# Patient Record
Sex: FEMALE | Race: WHITE | NOT HISPANIC OR LATINO | Employment: OTHER | URBAN - METROPOLITAN AREA
[De-identification: names, ages, dates, MRNs, and addresses within clinical notes are randomized per-mention and may not be internally consistent; named-entity substitution may affect disease eponyms.]

---

## 2019-12-13 ENCOUNTER — TRANSCRIBE ORDERS (OUTPATIENT)
Dept: ADMINISTRATIVE | Facility: HOSPITAL | Age: 59
End: 2019-12-13

## 2019-12-13 DIAGNOSIS — M25.512 LEFT SHOULDER PAIN, UNSPECIFIED CHRONICITY: Primary | ICD-10-CM

## 2019-12-20 ENCOUNTER — PROCEDURE VISIT (OUTPATIENT)
Dept: NEUROLOGY | Facility: CLINIC | Age: 59
End: 2019-12-20

## 2019-12-20 ENCOUNTER — HOSPITAL ENCOUNTER (OUTPATIENT)
Dept: MRI IMAGING | Facility: HOSPITAL | Age: 59
Discharge: HOME | End: 2019-12-20
Admitting: ORTHOPAEDIC SURGERY

## 2019-12-20 DIAGNOSIS — G56.02 CARPAL TUNNEL SYNDROME OF LEFT WRIST: Primary | ICD-10-CM

## 2019-12-20 DIAGNOSIS — M25.512 LEFT SHOULDER PAIN, UNSPECIFIED CHRONICITY: ICD-10-CM

## 2019-12-20 PROCEDURE — 95908 NRV CNDJ TST 3-4 STUDIES: CPT | Performed by: PSYCHIATRY & NEUROLOGY

## 2019-12-20 PROCEDURE — 95886 MUSC TEST DONE W/N TEST COMP: CPT | Performed by: PSYCHIATRY & NEUROLOGY

## 2019-12-20 PROCEDURE — 73221 MRI JOINT UPR EXTREM W/O DYE: CPT

## 2019-12-20 NOTE — PROGRESS NOTES
EMG and Nerve Conduction Studies    The complete report includes the data sheets.     Referring Doctor:DR. GALE    History:LUE/LEFT ARM PAIN     Results:    1.  The median sensory distal latency is prolonged.  The median motor NCS is normal.     2.  The Ulnar sensory and motor NCS's were normal    3. EMG of the muscles tested in the left C5-T1 myotomes were normal.    Impression:    This is an abnormal study.  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     Joseph Seipel, M.D.

## 2019-12-30 ENCOUNTER — TELEPHONE (OUTPATIENT)
Dept: NEUROLOGY | Facility: CLINIC | Age: 59
End: 2019-12-30

## 2019-12-31 PROBLEM — M54.9 INTRACTABLE BACK PAIN: Status: ACTIVE | Noted: 2019-12-05

## 2020-02-17 ENCOUNTER — TRANSCRIBE ORDERS (OUTPATIENT)
Dept: PHYSICAL THERAPY | Facility: CLINIC | Age: 60
End: 2020-02-17

## 2020-02-17 ENCOUNTER — TREATMENT (OUTPATIENT)
Dept: PHYSICAL THERAPY | Facility: CLINIC | Age: 60
End: 2020-02-17

## 2020-02-17 DIAGNOSIS — M54.12 RADICULOPATHY, CERVICAL: Primary | ICD-10-CM

## 2020-02-17 DIAGNOSIS — M25.512 ACUTE PAIN OF LEFT SHOULDER: ICD-10-CM

## 2020-02-17 DIAGNOSIS — M54.2 PAIN, NECK: Primary | ICD-10-CM

## 2020-02-17 PROCEDURE — 97162 PT EVAL MOD COMPLEX 30 MIN: CPT | Performed by: PHYSICAL THERAPIST

## 2020-02-17 PROCEDURE — 97140 MANUAL THERAPY 1/> REGIONS: CPT | Performed by: PHYSICAL THERAPIST

## 2020-02-17 PROCEDURE — 97014 ELECTRIC STIMULATION THERAPY: CPT | Performed by: PHYSICAL THERAPIST

## 2020-02-17 NOTE — PROGRESS NOTES
"  Physical Therapy Initial Evaluation and Plan of Care    Patient: Vivi Spence   : 1960  Diagnosis/ICD-10 Code:  Radiculopathy, cervical [M54.12]  Referring practitioner: Conor Quinones MD  Date of Initial Visit: 2020  Today's Date: 2020  Patient seen for 1 sessions           Subjective Questionnaire: QuickDASH: 77% impairment  NDI: 30% impairment      Subjective Evaluation    History of Present Illness  Mechanism of injury: Pt is 60 female with few yr hx of neck pain that radiates out to L shoulder. Pt also with tingling in L thumb and index finger. Pt did not sleep well this morning. Works 3rd shift. Pt hopes to avoid neck surgery. States she uses a \"bone\" pillow and sleeps with head back. Pt does not want to have surgery and states she has had epidural in 2019 in her upper back with no relief. Feels like L hand has gotten weak. Difficulty looking down with increased pain. Pt had MRI of L shoulder in 2019 that showed a couple of small tears. Pt states L shoulder pain is better and is not hurting now. Reports going to chiropractor 2x/wk but taking a break while receiving PT.      Patient Occupation: Full-time at [employer]. Quality of life: good    Pain  Current pain rating: 3  At best pain ratin  At worst pain ratin  Location: neck and L UT  Quality: needle-like, radiating and dull ache  Relieving factors: heat and rest  Aggravating factors: lifting, overhead activity, sleeping and prolonged positioning  Progression: worsening    Social Support  Lives with: spouse    Hand dominance: left    Treatments  Current treatment: chiropractic  Patient Goals  Patient goals for therapy: decreased pain, increased motion, increased strength and independence with ADLs/IADLs             Objective       Postural Observations  Seated posture: fair    Additional Postural Observation Details  Forward head, rounded shoulder, L scap abducted.    Palpation   Left   Tenderness of the cervical " paraspinals, suboccipitals and upper trapezius.     Active Range of Motion   Left Shoulder   Flexion: 95 degrees with pain  External rotation BTH: C2   Internal rotation BTB: L1     Right Shoulder   External rotation BTH: C6   Internal rotation BTB: L1     Additional Active Range of Motion Details  Mild limitation in cervical flexion, and R rotation. Mod limitation in L rotation and R sidebending.    Passive Range of Motion   Left Shoulder   Normal passive range of motion    Strength/Myotome Testing     Left Shoulder     Planes of Motion   Flexion: 3-   Abduction: 4   External rotation at 0°: 4-     Right Shoulder     Planes of Motion   Flexion: 4+   Abduction: 4+   External rotation at 0°: 4     Left Elbow   Flexion: 4-  Extension: 4-    Right Elbow   Flexion: 4+  Extension: 4+    Left Wrist/Hand   Wrist extension: 3+    Right Wrist/Hand   Wrist extension: 4+    Additional Strength Details  Decreased  L hand with pt unable to make full fist.   C/o L shoulder pain with strength testing.    Tests   Cervical     Left   Positive cervical distraction.          Assessment & Plan     Assessment  Impairments: abnormal or restricted ROM, activity intolerance, impaired physical strength, lacks appropriate home exercise program and pain with function  Other impairment: impaired posture  Assessment details: Pt is 59 yo female with c/o worsening neck pain with L UE radicular symptoms in thumb and index finger. Pt also with decreased strength and AROM of R shoulder with pain. Pt is having difficulty with looking down >5 min. Difficulty with overhead tasks. Difficulty turning head to L. Difficulty with numbness L thumb and index finger (dominant hand). Quick DASH indicates 77% impairment. NDI indicates 30% impairment.    Patient presents with the impairments listed above and based on the objective findings and the physical therapy evaluation, the patient’s condition has the potential to improve in response to therapy.   The  patient’s condition and/or services required are at a level of complexity that necessitates the skill & supervision of a physical therapist.    Prognosis: good  Functional Limitations: carrying objects, lifting, sleeping, pulling, pushing, uncomfortable because of pain, reaching behind back, reaching overhead and unable to perform repetitive tasks  Goals  Plan Goals: STG:  - Pt to report 25% improvement in L thumb and index finger numbness in 3 weeks.  - Pt to demonstrate improved posture with cues in 2 weeks.  - Pt to be independent with HEP in 3 weeks.  LTG:  - Improve Quick DASH score to 30% or less impairment by discharge.  - Increase L shoulder flex strength to 4-/5 or better for improved functional use of L UE by discharge.  - Pt to report 50% improvement in L UE radicular symptoms by discharge.    Plan  Therapy options: will be seen for skilled physical therapy services  Planned modality interventions: TENS, thermotherapy (hydrocollator packs) and traction  Other planned modality interventions: modalities as indicated  Frequency: 2x week  Duration in visits: 12  Treatment plan discussed with: patient        Timed:         Manual Therapy:    15     mins  73720;     Therapeutic Exercise:    5     mins  89715;     Neuromuscular Waldo:        mins  35705;    Therapeutic Activity:          mins  83294;     Gait Training:           mins  15341;     Ultrasound:          mins  67896;    Ionto                                   mins   99264  Can Repos          mins 09742    Un-Timed:  Electrical Stimulation:    15     mins  85099 ( );  Dry Needling          mins self-pay  Traction          mins 74719  Low Eval          Mins  78828  Mod Eval     25     Mins  13092  High Eval                            Mins  97122  Self - Care                          mins  66440        Timed Treatment:   20   mins   Total Treatment:     60   mins    PT SIGNATURE: Regi Kolb, PT   DATE TREATMENT INITIATED:  2/17/2020    Initial Certification  Certification Period: 5/17/2020  I certify that the therapy services are furnished while this patient is under my care.  The services outlined above are required by this patient, and will be reviewed every 90 days.     PHYSICIAN: Conor Quinones MD ____________________________________________________________________________     DATE:     Please sign and return via fax to  .. Thank you, [facility].

## 2020-02-20 ENCOUNTER — HOSPITAL ENCOUNTER (OUTPATIENT)
Dept: GENERAL RADIOLOGY | Facility: HOSPITAL | Age: 60
Discharge: HOME | End: 2020-02-20

## 2020-02-20 ENCOUNTER — HOSPITAL ENCOUNTER (OUTPATIENT)
Dept: GENERAL RADIOLOGY | Facility: HOSPITAL | Age: 60
Discharge: HOME | End: 2020-02-20
Admitting: ORTHOPAEDIC SURGERY

## 2020-02-20 DIAGNOSIS — M25.551 BILATERAL HIP PAIN: ICD-10-CM

## 2020-02-20 DIAGNOSIS — M25.552 BILATERAL HIP PAIN: ICD-10-CM

## 2020-02-20 DIAGNOSIS — M54.50 LOW BACK PAIN: ICD-10-CM

## 2020-02-20 PROCEDURE — 72170 X-RAY EXAM OF PELVIS: CPT

## 2020-02-20 PROCEDURE — 72100 X-RAY EXAM L-S SPINE 2/3 VWS: CPT

## 2020-02-21 ENCOUNTER — TREATMENT (OUTPATIENT)
Dept: PHYSICAL THERAPY | Facility: CLINIC | Age: 60
End: 2020-02-21

## 2020-02-21 DIAGNOSIS — M54.12 RADICULOPATHY, CERVICAL: Primary | ICD-10-CM

## 2020-02-21 DIAGNOSIS — M25.512 ACUTE PAIN OF LEFT SHOULDER: ICD-10-CM

## 2020-02-21 PROCEDURE — 97110 THERAPEUTIC EXERCISES: CPT | Performed by: PHYSICAL THERAPIST

## 2020-02-21 PROCEDURE — 97014 ELECTRIC STIMULATION THERAPY: CPT | Performed by: PHYSICAL THERAPIST

## 2020-02-21 PROCEDURE — 97140 MANUAL THERAPY 1/> REGIONS: CPT | Performed by: PHYSICAL THERAPIST

## 2020-02-21 NOTE — PROGRESS NOTES
Physical Therapy Daily Progress Note      Patient: Vivi Spence   : 1960  Diagnosis/ICD-10 Code:  Radiculopathy, cervical [M54.12]  Referring practitioner: Conor Quinones MD  Date of Initial Visit: Type: THERAPY  Noted: 2020  Today's Date: 2020  Patient seen for 2 sessions         Vivi Spence reports: she continues to have the same initial pains and symptoms but states the initial visit did help to relieve her pain and symptoms for the remainder of that day she felt improved. Pt. Inquired about a neck massager and what methods were appropriate for home use. Pt. Reports her L hip is bothering her badly this date as well.    Objective   See Exercise, Manual, and Modality Logs for complete treatment.     Assessment/Plan   Pt. Tolerated treatment well this date and was encouraged to approach any outside use of massager with hesitancy at first and increase as tolerated for improving relief. Pt. Was given information on home TENS unit and responded well to therapy this date.     Progress per Plan of Care           Timed:         Manual Therapy:    15     mins  75552;     Therapeutic Exercise:    15     mins  35929;     Neuromuscular Waldo:        mins  94924;    Therapeutic Activity:          mins  95313;     Gait Training:           mins  85621;     Ultrasound:          mins  57077;    Ionto                                   mins   86930  Self Care                            mins   94587  Canalith Repos                   mins  4209    Un-Timed:  Electrical Stimulation:    15     mins  15832 ( );  Dry Needling          mins self-pay  Traction          mins 24976  Low Eval          Mins  44147  Mod Eval          Mins  73933  High Eval                            Mins  44279    Timed Treatment:   30   mins   Total Treatment:     45   mins    Sandi Rajput PTA  Physical Therapist Assistant License #[id number]

## 2020-02-26 ENCOUNTER — TREATMENT (OUTPATIENT)
Dept: PHYSICAL THERAPY | Facility: CLINIC | Age: 60
End: 2020-02-26

## 2020-02-26 DIAGNOSIS — M25.512 ACUTE PAIN OF LEFT SHOULDER: ICD-10-CM

## 2020-02-26 DIAGNOSIS — M54.12 RADICULOPATHY, CERVICAL: Primary | ICD-10-CM

## 2020-02-26 PROCEDURE — 97012 MECHANICAL TRACTION THERAPY: CPT | Performed by: PHYSICAL THERAPIST

## 2020-02-26 PROCEDURE — 97110 THERAPEUTIC EXERCISES: CPT | Performed by: PHYSICAL THERAPIST

## 2020-02-26 PROCEDURE — 97140 MANUAL THERAPY 1/> REGIONS: CPT | Performed by: PHYSICAL THERAPIST

## 2020-02-26 NOTE — PROGRESS NOTES
Physical Therapy Daily Progress Note      Patient: Vivi Spence   : 1960  Diagnosis/ICD-10 Code:  Radiculopathy, cervical [M54.12]  Referring practitioner: Conor Quinones MD  Date of Initial Visit: Type: THERAPY  Noted: 2020  Today's Date: 2020  Patient seen for 3 sessions         Vivi Spence reports: she isn't having pain today stating she has been sleeping with a cervical pillow and I that helped tremendously.      Objective   See Exercise, Manual, and Modality Logs for complete treatment.       Assessment/Plan   Pt. responded well to initiation of traction this date reporting suboccipital soreness following but noted some improvement in hand numbness. Pt. Continues to tolerate manual intervention and exercises well.    Progress per Plan of Care           Timed:         Manual Therapy:    15     mins  59109;     Therapeutic Exercise:    10     mins  84806;     Neuromuscular Waldo:        mins  91593;    Therapeutic Activity:          mins  37817;     Gait Training:           mins  61437;     Ultrasound:          mins  90144;    Ionto                                   mins   00539  Self Care                            mins   01584  Canalith Repos                   mins  4209    Un-Timed:  Electrical Stimulation:         mins  82858 ( );  Dry Needling          mins self-pay  Traction     15     mins 57736  Low Eval          Mins  82060  Mod Eval          Mins  94527  High Eval                            Mins  75652    Timed Treatment:   25   mins   Total Treatment:     40   mins    Sandi Rajput PTA  Physical Therapist Assistant License #[id number]

## 2020-03-09 ENCOUNTER — TREATMENT (OUTPATIENT)
Dept: PHYSICAL THERAPY | Facility: CLINIC | Age: 60
End: 2020-03-09

## 2020-03-09 DIAGNOSIS — M54.12 RADICULOPATHY, CERVICAL: Primary | ICD-10-CM

## 2020-03-09 DIAGNOSIS — M25.512 ACUTE PAIN OF LEFT SHOULDER: ICD-10-CM

## 2020-03-09 PROCEDURE — 97110 THERAPEUTIC EXERCISES: CPT | Performed by: PHYSICAL THERAPIST

## 2020-03-09 PROCEDURE — 97140 MANUAL THERAPY 1/> REGIONS: CPT | Performed by: PHYSICAL THERAPIST

## 2020-03-09 PROCEDURE — 97014 ELECTRIC STIMULATION THERAPY: CPT | Performed by: PHYSICAL THERAPIST

## 2020-03-09 NOTE — PROGRESS NOTES
Physical Therapy Daily Progress Note      Patient: Vivi Spence   : 1960  Diagnosis/ICD-10 Code:  Radiculopathy, cervical [M54.12]  Referring practitioner: Conor Quinones MD  Date of Initial Visit: Type: THERAPY  Noted: 2020  Today's Date: 3/9/2020  Patient seen for 4 sessions         Vivi Spence reports: she felt much better after traction however states she has had the flu and didn't want to bring it here so she did not come for a few visits. Pt. States she has bronchitis now and is having a really bad intermittent cough.    Objective   See Exercise, Manual, and Modality Logs for complete treatment.     Assessment/Plan   Pt. Responds well to manual intervention and exercise this date having much improved cervical mobility and decreased pain. Traction was held this date due to the persistent cough and the inability to lie still for that long.    Progress per Plan of Care           Timed:         Manual Therapy:    10     mins  08730;     Therapeutic Exercise:    15     mins  97433;     Neuromuscular Waldo:        mins  75015;    Therapeutic Activity:          mins  57160;     Gait Training:           mins  59937;     Ultrasound:          mins  31519;    Ionto                                   mins   14609  Self Care                            mins   69876  Canalith Repos                   mins  4209    Un-Timed:  Electrical Stimulation:    15     mins  82349 ( );  Dry Needling          mins self-pay  Traction          mins 22590  Low Eval          Mins  49403  Mod Eval          Mins  90592  High Eval                            Mins  23139    Timed Treatment:   25   mins   Total Treatment:     40   mins    Sandi Rajput PTA  Physical Therapist Assistant License #[id number]

## 2020-03-13 ENCOUNTER — TREATMENT (OUTPATIENT)
Dept: PHYSICAL THERAPY | Facility: CLINIC | Age: 60
End: 2020-03-13

## 2020-03-13 DIAGNOSIS — M25.512 ACUTE PAIN OF LEFT SHOULDER: ICD-10-CM

## 2020-03-13 DIAGNOSIS — M54.12 RADICULOPATHY, CERVICAL: Primary | ICD-10-CM

## 2020-03-13 PROCEDURE — 97012 MECHANICAL TRACTION THERAPY: CPT | Performed by: PHYSICAL THERAPIST

## 2020-03-13 PROCEDURE — 97110 THERAPEUTIC EXERCISES: CPT | Performed by: PHYSICAL THERAPIST

## 2020-03-13 PROCEDURE — 97140 MANUAL THERAPY 1/> REGIONS: CPT | Performed by: PHYSICAL THERAPIST

## 2020-03-13 NOTE — PROGRESS NOTES
Physical Therapy Daily Progress Note      Patient: Vivi Spence   : 1960  Diagnosis/ICD-10 Code:  Radiculopathy, cervical [M54.12]  Referring practitioner: Conor Quinones MD  Date of Initial Visit: Type: THERAPY  Noted: 2020  Today's Date: 3/13/2020  Patient seen for 5 sessions         Vivi Spence reports: she has been feeling better only thing still bothering her is two numb fingers in her L hand and wonders if it'll ever go away.    Objective   See Exercise, Manual, and Modality Logs for complete treatment.     Assessment/Plan   Pt. presents with increasing cervical ROM ad no shoulder pain at this time. Pt. Responds increasingly well to traction performed for second time this visit with increased pull to 12 pounds.     Progress per Plan of Care           Timed:         Manual Therapy:    20     mins  25665;     Therapeutic Exercise:    8     mins  77135;     Neuromuscular Waldo:        mins  45665;    Therapeutic Activity:          mins  79937;     Gait Training:           mins  58499;     Ultrasound:          mins  70187;    Ionto                                   mins   64605  Self Care                            mins   85020  Canalith Repos                   mins  4209    Un-Timed:  Electrical Stimulation:         mins  61476 ( );  Dry Needling          mins self-pay  Traction     15     mins 35284  Low Eval          Mins  04789  Mod Eval          Mins  10870  High Eval                            Mins  84270    Timed Treatment:   28   mins   Total Treatment:     43   mins    Sandi Rajput PTA  Physical Therapist Assistant License #[id number]

## 2020-03-16 ENCOUNTER — TREATMENT (OUTPATIENT)
Dept: PHYSICAL THERAPY | Facility: CLINIC | Age: 60
End: 2020-03-16

## 2020-03-16 DIAGNOSIS — M25.512 ACUTE PAIN OF LEFT SHOULDER: ICD-10-CM

## 2020-03-16 DIAGNOSIS — M54.12 RADICULOPATHY, CERVICAL: Primary | ICD-10-CM

## 2020-03-16 PROCEDURE — 97140 MANUAL THERAPY 1/> REGIONS: CPT | Performed by: PHYSICAL THERAPIST

## 2020-03-16 PROCEDURE — 97012 MECHANICAL TRACTION THERAPY: CPT | Performed by: PHYSICAL THERAPIST

## 2020-03-16 NOTE — PROGRESS NOTES
Physical Therapy Daily Progress Note      Patient: Vivi Spence   : 1960  Diagnosis/ICD-10 Code:  Radiculopathy, cervical [M54.12]  Referring practitioner: Conor Quinones MD  Date of Initial Visit: Type: THERAPY  Noted: 2020  Today's Date: 3/16/2020  Patient seen for 6 sessions         Vivi Spence reports: she continues to have good relief with traction and states she can make a fist, raise her L arm higher, and oppose all fingers now with minimal numbness intermittent.    Objective   See Exercise, Manual, and Modality Logs for complete treatment.     Assessment/Plan   Pt. Continues to show improved L UE mobility and strength with continued traction with measured L hand  strength at 40 pounds and R hand  strength at 60 pounds showing improvement but the need for further strength gains.    Progress per Plan of Care           Timed:         Manual Therapy:    15     mins  95301;     Therapeutic Exercise:    6     mins  59617;     Neuromuscular Waldo:        mins  41790;    Therapeutic Activity:          mins  25553;     Gait Training:           mins  85451;     Ultrasound:          mins  66841;    Ionto                                   mins   77195  Self Care                            mins   28240  Canalith Repos                   mins  4209    Un-Timed:  Electrical Stimulation:         mins  53383 ( );  Dry Needling          mins self-pay  Traction     15     mins 03752  Low Eval          Mins  50794  Mod Eval          Mins  64980  High Eval                            Mins  34931    Timed Treatment:   21   mins   Total Treatment:     36   mins    Sandi Rajput PTA  Physical Therapist Assistant License #[id number]

## 2020-03-20 ENCOUNTER — TREATMENT (OUTPATIENT)
Dept: PHYSICAL THERAPY | Facility: CLINIC | Age: 60
End: 2020-03-20

## 2020-03-20 DIAGNOSIS — M54.12 RADICULOPATHY, CERVICAL: Primary | ICD-10-CM

## 2020-03-20 DIAGNOSIS — M25.512 ACUTE PAIN OF LEFT SHOULDER: ICD-10-CM

## 2020-03-20 PROCEDURE — 97012 MECHANICAL TRACTION THERAPY: CPT | Performed by: PHYSICAL THERAPIST

## 2020-03-20 PROCEDURE — 97140 MANUAL THERAPY 1/> REGIONS: CPT | Performed by: PHYSICAL THERAPIST

## 2020-03-20 NOTE — PROGRESS NOTES
Physical Therapy Daily Progress Note      Patient: Vivi Spence   : 1960  Diagnosis/ICD-10 Code:  Radiculopathy, cervical [M54.12]  Referring practitioner: Conor Quinones MD  Date of Initial Visit: Type: THERAPY  Noted: 2020  Today's Date: 3/20/2020  Patient seen for 7 sessions      Subjective : Pt reports she is feeling better overall with less pain and L UE radicular symptoms. States she does have numbness and tingling at end of work shift.    Objective : Pt in agreement to manage with HEP at this time and will return for additional PT at later date.  See Exercise, Manual, and Modality Logs for complete treatment.       Assessment/Plan : Good tolerance to ther ex. L hand symptoms at start of session due to working all night.     Other : Pt to manage with HEP at this time due to COVID-19 pandemic and will return for additional PT treatment at later date.           Timed:         Manual Therapy:    15     mins  95878;     Therapeutic Exercise:    5     mins  90399;     Neuromuscular Waldo:        mins  11163;    Therapeutic Activity:          mins  78213;     Gait Training:           mins  39150;     Ultrasound:          mins  55536;    Ionto                                   mins   57106  Self Care                            mins   22661  Canalith Repos                   mins  4209    Un-Timed:  Electrical Stimulation:    15     mins  35168 ( );  Dry Needling          mins self-pay  Traction          mins 06972  Low Eval          Mins  90651  Mod Eval          Mins  45127  High Eval                            Mins  48651    Timed Treatment:   20   mins   Total Treatment:     35   mins    Regi Kolb, PT  Physical Therapist

## 2020-03-26 ENCOUNTER — DOCUMENTATION (OUTPATIENT)
Dept: PHYSICAL THERAPY | Facility: CLINIC | Age: 60
End: 2020-03-26

## 2020-03-26 NOTE — PROGRESS NOTES
Called pt to inform that PT appts to be cancelled due to COVID-19 concerns. Instructed pt to continue with HEP and to call with any questions. Plan to hold PT at this time until further notice per [employer] policy.

## 2020-06-03 ENCOUNTER — DOCUMENTATION (OUTPATIENT)
Dept: PHYSICAL THERAPY | Facility: CLINIC | Age: 60
End: 2020-06-03

## 2020-06-03 DIAGNOSIS — M54.12 RADICULOPATHY, CERVICAL: Primary | ICD-10-CM

## 2020-06-03 DIAGNOSIS — M25.512 ACUTE PAIN OF LEFT SHOULDER: ICD-10-CM

## 2020-06-03 NOTE — PROGRESS NOTES
Discharge Summary  Discharge Summary from Physical Therapy Report      Dates  PT visit: 2/17/2002 - 3/20/2020  Number of Visits: 7     Discharge Status of Patient: See MD Note dated 3/20/2020    Goals: Partially Met    Discharge Plan: Continue with current home exercise program as instructed    Comments : At last visit, pt reports she was feeling better overall with less pain and L UE radicular symptoms. Stated she continued to have numbness and tingling at end of work shift. Pt instructed at last visit to continue with HEP and additional visits were cancelled due to COVID-19 pandemic. Voicemail was left for pt on 5/5/2020 to call back and schedule PT if needed and no appts were made.    Date of Discharge 6/3/2020        Regi Kolb, PT  Physical Therapist

## 2022-04-08 ENCOUNTER — OFFICE VISIT (OUTPATIENT)
Dept: PAIN MEDICINE | Facility: CLINIC | Age: 62
End: 2022-04-08

## 2022-04-08 VITALS
HEIGHT: 65 IN | DIASTOLIC BLOOD PRESSURE: 89 MMHG | OXYGEN SATURATION: 97 % | WEIGHT: 197 LBS | HEART RATE: 83 BPM | RESPIRATION RATE: 16 BRPM | BODY MASS INDEX: 32.82 KG/M2 | SYSTOLIC BLOOD PRESSURE: 140 MMHG

## 2022-04-08 DIAGNOSIS — M51.26 DISPLACEMENT OF LUMBAR INTERVERTEBRAL DISC WITHOUT MYELOPATHY: Primary | ICD-10-CM

## 2022-04-08 PROCEDURE — G0463 HOSPITAL OUTPT CLINIC VISIT: HCPCS | Performed by: STUDENT IN AN ORGANIZED HEALTH CARE EDUCATION/TRAINING PROGRAM

## 2022-04-08 PROCEDURE — 99204 OFFICE O/P NEW MOD 45 MIN: CPT | Performed by: STUDENT IN AN ORGANIZED HEALTH CARE EDUCATION/TRAINING PROGRAM

## 2022-04-08 RX ORDER — LOSARTAN POTASSIUM 50 MG/1
TABLET ORAL
COMMUNITY

## 2022-04-08 RX ORDER — TRAZODONE HYDROCHLORIDE 100 MG/1
TABLET ORAL
COMMUNITY
Start: 2017-11-13

## 2022-04-08 RX ORDER — CETIRIZINE HYDROCHLORIDE 10 MG/1
10 TABLET ORAL DAILY
COMMUNITY
Start: 2022-03-08

## 2022-04-08 RX ORDER — GABAPENTIN 300 MG/1
300 CAPSULE ORAL 3 TIMES DAILY
Qty: 90 CAPSULE | Refills: 0 | Status: SHIPPED | OUTPATIENT
Start: 2022-04-08 | End: 2022-05-12 | Stop reason: SDUPTHER

## 2022-04-08 RX ORDER — ALBUTEROL SULFATE 90 UG/1
AEROSOL, METERED RESPIRATORY (INHALATION)
COMMUNITY

## 2022-04-08 NOTE — PROGRESS NOTES
CHIEF COMPLAINT  Chief Complaint   Patient presents with   • Back Pain     Left side back pain No narcotics       Primary Care  Main, Franco Meraz MD    Subjective   Vivi Spence is a 62 y.o. female  who presents for lumbar radiculopathy.  She describes pain in the low back which radiates into the left lower extremity especially in the lateral aspect of the left leg into the left calf.  She has been undergoing chiropractic care but she did not experience any significant relief.  Her chiropractor ordered an MRI showing disc displacement at L5-S1 with foraminal stenosis and contact of the exiting S1 nerve roots which likely explains her pain.  She describes a sharp, stabbing pain which shoots into the buttock and down the left leg.  She denies any significant numbness or weakness or red flag symptoms such as loss of bowel or bladder.    History of Present Illness     Location: Low back with radiation in the left lower extremity  Onset: Several months ago  Duration: Progressively worsening  Timing: Constant throughout the day  Quality: Sharp, stabbing  Severity: Today: 9       Last Week: 9       Worst: 10  Modifying Factors: The pain is worse with any type of movement and activity and slightly improved with rest    Physical Therapy: She is currently doing chiropractic care    Interval Update 04/08/2022:     The following portions of the patient's history were reviewed and updated as appropriate: allergies, current medications, past family history, past medical history, past social history, past surgical history and problem list.      Current Outpatient Medications:   •  albuterol sulfate  (90 Base) MCG/ACT inhaler, Ventolin HFA 90 mcg/actuation aerosol inhaler  as needed, Disp: , Rfl:   •  cetirizine (zyrTEC) 10 MG tablet, Take 10 mg by mouth Daily., Disp: , Rfl:   •  losartan (COZAAR) 50 MG tablet, losartan 50 mg tablet  TAKE 1 TABLET BY MOUTH ONCE DAILY, Disp: , Rfl:   •  traZODone (DESYREL) 100 MG  "tablet, trazodone 100 mg tablet  TAKE 1 TABLET BY MOUTH AT BEDTIME AS NEEDED, Disp: , Rfl:   •  gabapentin (NEURONTIN) 300 MG capsule, Take 1 capsule by mouth 3 (Three) Times a Day., Disp: 90 capsule, Rfl: 0    Review of Systems   Musculoskeletal: Positive for arthralgias, back pain and gait problem. Negative for myalgias.   Neurological: Negative for weakness and numbness.       Vitals:    04/08/22 1411   BP: 140/89   Pulse: 83   Resp: 16   SpO2: 97%   Weight: 89.4 kg (197 lb)   Height: 165.1 cm (65\")   PainSc:   9       Objective   Physical Exam  Vitals and nursing note reviewed.   Constitutional:       General: She is not in acute distress.     Appearance: Normal appearance. She is well-developed. She is obese.   Neck:      Trachea: No tracheal deviation.   Musculoskeletal:      Comments: Lumbar Spine Exam:  Tender to palpation over the lumbar paraspinal musculature Yes  Limited range of motion secondary to pain Yes  Facet loading positive: Equivocal  Facets tender to palpation: Equivocal  Straight leg raise test positive: left       Neurological:      Mental Status: She is alert.      Sensory: No sensory deficit.      Motor: No weakness.           Assessment/Plan   Problems Addressed this Visit    None     Visit Diagnoses     Displacement of lumbar intervertebral disc without myelopathy    -  Primary    Relevant Orders    Epidural Block      Diagnoses       Codes Comments    Displacement of lumbar intervertebral disc without myelopathy    -  Primary ICD-10-CM: M51.26  ICD-9-CM: 722.10           Plan:  1. Given her katie radiculopathy in the left lower extremity as well as her positive straight leg raise test and MRI evidence of foraminal stenosis, I feel that she would get significant benefit from lumbar epidural steroid injection.  2. We will also start her on gabapentin 300 mg 3 times daily for lumbar radiculopathy  3. Plan for lumbar epidural steroid injection at the L5-S1 level and then she can continue with " physical therapy through chiropractic.  --- Follow-up next available for lumbar epidural steroid injection           INSPECT REPORT    As part of the patient's treatment plan, I may be prescribing controlled substances. The patient has been made aware of appropriate use of such medications, including potential risk of somnolence, limited ability to drive and/or work safely, and the potential for dependence or overdose. It has also bee made clear that these medications are for use by this patient only, without concomitant use of alcohol or other substances unless prescribed.     Patient has completed prescribing agreement detailing terms of continued prescribing of controlled substances, including monitoring EVELYN reports, urine drug screening, and pill counts if necessary. The patient is aware that inappropriate use will results in cessation of prescribing such medications.    INSPECT report has been reviewed and scanned into the patient's chart.    As the clinician, I personally reviewed the INSPECT from 4/7/2022.    History and physical exam exhibit continued safe and appropriate use of controlled substances.      EMR Dragon/Transcription disclaimer:   Much of this encounter note is an electronic transcription/translation of spoken language to printed text. The electronic translation of spoken language may permit erroneous, or at times, nonsensical words or phrases to be inadvertently transcribed; Although I have reviewed the note for such errors, some may still exist.

## 2022-04-14 ENCOUNTER — HOSPITAL ENCOUNTER (OUTPATIENT)
Dept: GENERAL RADIOLOGY | Facility: HOSPITAL | Age: 62
Discharge: HOME | End: 2022-04-14

## 2022-04-14 ENCOUNTER — HOSPITAL ENCOUNTER (OUTPATIENT)
Dept: PAIN MEDICINE | Facility: HOSPITAL | Age: 62
Discharge: HOME | End: 2022-04-14

## 2022-04-14 VITALS
WEIGHT: 197 LBS | HEIGHT: 65 IN | BODY MASS INDEX: 32.82 KG/M2 | OXYGEN SATURATION: 98 % | SYSTOLIC BLOOD PRESSURE: 127 MMHG | HEART RATE: 90 BPM | DIASTOLIC BLOOD PRESSURE: 75 MMHG | RESPIRATION RATE: 16 BRPM

## 2022-04-14 DIAGNOSIS — M51.26 DISPLACEMENT OF LUMBAR INTERVERTEBRAL DISC WITHOUT MYELOPATHY: Primary | ICD-10-CM

## 2022-04-14 DIAGNOSIS — M54.50 LOWER BACK PAIN: ICD-10-CM

## 2022-04-14 PROCEDURE — 77003 FLUOROGUIDE FOR SPINE INJECT: CPT

## 2022-04-14 PROCEDURE — 25010000002 METHYLPREDNISOLONE PER 40 MG: Performed by: STUDENT IN AN ORGANIZED HEALTH CARE EDUCATION/TRAINING PROGRAM

## 2022-04-14 PROCEDURE — 0 IOPAMIDOL 41 % SOLUTION: Performed by: STUDENT IN AN ORGANIZED HEALTH CARE EDUCATION/TRAINING PROGRAM

## 2022-04-14 PROCEDURE — 62323 NJX INTERLAMINAR LMBR/SAC: CPT | Performed by: STUDENT IN AN ORGANIZED HEALTH CARE EDUCATION/TRAINING PROGRAM

## 2022-04-14 RX ORDER — METHYLPREDNISOLONE ACETATE 40 MG/ML
40 INJECTION, SUSPENSION INTRA-ARTICULAR; INTRALESIONAL; INTRAMUSCULAR; SOFT TISSUE ONCE
Status: COMPLETED | OUTPATIENT
Start: 2022-04-14 | End: 2022-04-14

## 2022-04-14 RX ORDER — BUPIVACAINE HYDROCHLORIDE 5 MG/ML
10 INJECTION, SOLUTION EPIDURAL; INTRACAUDAL ONCE
Status: COMPLETED | OUTPATIENT
Start: 2022-04-14 | End: 2022-04-14

## 2022-04-14 RX ADMIN — METHYLPREDNISOLONE ACETATE 40 MG: 40 INJECTION, SUSPENSION INTRA-ARTICULAR; INTRALESIONAL; INTRAMUSCULAR; SOFT TISSUE at 08:53

## 2022-04-14 RX ADMIN — BUPIVACAINE HYDROCHLORIDE 10 ML: 5 INJECTION, SOLUTION EPIDURAL; INTRACAUDAL; PERINEURAL at 08:52

## 2022-04-14 RX ADMIN — IOPAMIDOL 3 ML: 408 INJECTION, SOLUTION INTRATHECAL at 08:53

## 2022-04-14 NOTE — PROCEDURES
Lumbar Epidural Steroid Injection  [facility]    PREOPERATIVE DIAGNOSIS:   Chronic low back pain, Lumbar Degenerative Disc Disease and Lumbar Disc Displacement  POSTOPERATIVE DIAGNOSIS:  Same as preop diagnosis    PROCEDURE:   Lumbar Epidural Steroid Injection, Therapeutic Translaminar Injection, with epidurogram, at  L5/S1 level    PRE-PROCEDURE DISCUSSION WITH PATIENT:    Risks and complications were discussed with the patient prior to starting the procedure and informed consent was obtained.  We discussed various topics including but not limited to bleeding, infection, injury, paralysis, nerve injury, dural puncture, coma, death, worsening of clinical picture, lack of pain relief, and postprocedural soreness.    SURGEON:  Filippo Marin MD    REASON FOR PROCEDURE:    Radiating pattern of pain is likely consistent with degenerative changes in the area., Radicular pain pattern seems consistent with this dermatome. and Nerve root provocation positivity is noted.      SEDATION:  Patient declined administration of moderate sedation    ANESTHETIC:  Marcaine 0.25%  STEROID:   Methylprednisolone (DEPO MEDROL) 40mg/ml    DESCRIPTON OF PROCEDURE:    After obtaining informed consent, I.V. was not started in the preop area.   The patient was taken to the operating room and placed in the prone position.  All pressure points were well padded.  The lumbar spine area was prepped with Chloraprep and draped in a sterile fashion.  Under fluoroscopic guidance, the above mentioned interlaminar space was identified. Skin and subcutaneous tissues were anesthetized with 1% lidocaine in the middle of the space. A Tuohy needle was introduced through the skin and advanced to this interlaminar space and into the epidural space under fluoroscopic guidance and verified with loss-of-resistance technique to air.  After confirming the position of the needle with the fluoroscope with all the views, and after aspiration was confirmed  negative for blood and CSF, 1.5 mL of Omnipaque was injected.  After seeing appropriate epidurogram with lateral and PA views, a total of 4 cc solution was injected, consisting of 3cc of local anesthetic as above, with normal saline and injectable steroid as above.     ESTIMATED BLOOD LOSS:  <5 mL  SPECIMENS:  None    COMPLICATIONS:     No complications were noted., There was no indication of vascular uptake on live injection of contrast dye., There was no indication of intrathecal uptake on live injection of contrast dye. and There was not any evidence of dural puncture.      TOLERANCE & DISCHARGE CONDITION:    The patient tolerated the procedure well.  The patient was transported to the recovery area without difficulties.  The patient was discharged to home under the care of family in stable and satisfactory condition.    PLAN OF CARE:  1. The patient was given our standard instruction sheet.  2. The patient will Return to clinic 4-6 wks  3. The patient will resume all medications as per the medication reconciliation sheet.

## 2022-05-13 RX ORDER — GABAPENTIN 300 MG/1
300 CAPSULE ORAL 3 TIMES DAILY
Qty: 90 CAPSULE | Refills: 0 | Status: SHIPPED | OUTPATIENT
Start: 2022-05-13 | End: 2022-05-27 | Stop reason: SDUPTHER

## 2022-05-27 ENCOUNTER — OFFICE VISIT (OUTPATIENT)
Dept: PAIN MEDICINE | Facility: CLINIC | Age: 62
End: 2022-05-27

## 2022-05-27 VITALS
HEART RATE: 96 BPM | RESPIRATION RATE: 16 BRPM | SYSTOLIC BLOOD PRESSURE: 131 MMHG | BODY MASS INDEX: 32.82 KG/M2 | TEMPERATURE: 96.9 F | DIASTOLIC BLOOD PRESSURE: 81 MMHG | WEIGHT: 197 LBS | HEIGHT: 65 IN | OXYGEN SATURATION: 99 %

## 2022-05-27 DIAGNOSIS — M51.26 DISPLACEMENT OF LUMBAR INTERVERTEBRAL DISC WITHOUT MYELOPATHY: Primary | ICD-10-CM

## 2022-05-27 PROCEDURE — 99214 OFFICE O/P EST MOD 30 MIN: CPT | Performed by: STUDENT IN AN ORGANIZED HEALTH CARE EDUCATION/TRAINING PROGRAM

## 2022-05-27 PROCEDURE — G0463 HOSPITAL OUTPT CLINIC VISIT: HCPCS | Performed by: STUDENT IN AN ORGANIZED HEALTH CARE EDUCATION/TRAINING PROGRAM

## 2022-05-27 RX ORDER — GABAPENTIN 300 MG/1
300 CAPSULE ORAL 3 TIMES DAILY
Qty: 90 CAPSULE | Refills: 0 | Status: SHIPPED | OUTPATIENT
Start: 2022-05-27 | End: 2022-07-29 | Stop reason: SDUPTHER

## 2022-05-27 RX ORDER — GABAPENTIN 100 MG/1
100 CAPSULE ORAL 3 TIMES DAILY
Qty: 90 CAPSULE | Refills: 0 | Status: SHIPPED | OUTPATIENT
Start: 2022-06-27 | End: 2022-07-29

## 2022-05-27 NOTE — PROGRESS NOTES
CHIEF COMPLAINT  Chief Complaint   Patient presents with   • Back Pain     Gabapentin LD 5/27/22 @ 0700       Primary Care  Main, Franco Meraz MD    Subjective   Vivi Spence is a 62 y.o. female  who presents for lumbar radiculopathy.  She describes pain in the low back which radiates into the left lower extremity especially in the lateral aspect of the left leg into the left calf.  She has been undergoing chiropractic care but she did not experience any significant relief.  Her chiropractor ordered an MRI showing disc displacement at L5-S1 with foraminal stenosis and contact of the exiting S1 nerve roots which likely explains her pain.  She describes a sharp, stabbing pain which shoots into the buttock and down the left leg.  She denies any significant numbness or weakness or red flag symptoms such as loss of bowel or bladder.    Back Pain  Pertinent negatives include no numbness or weakness.        Location: Low back with radiation in the left lower extremity  Onset: Several months ago  Duration: Progressively worsening  Timing: Constant throughout the day  Quality: Sharp, stabbing  Severity: Today: 4       Last Week: 4       Worst: 10  Modifying Factors: The pain is worse with any type of movement and activity and slightly improved with rest    Physical Therapy: She is currently doing chiropractic care    Interval Update 05/27/2022: Doing much better after lumbar epidural.  Doing well with gabapentin.    The following portions of the patient's history were reviewed and updated as appropriate: allergies, current medications, past family history, past medical history, past social history, past surgical history and problem list.      Current Outpatient Medications:   •  albuterol sulfate  (90 Base) MCG/ACT inhaler, Ventolin HFA 90 mcg/actuation aerosol inhaler  as needed, Disp: , Rfl:   •  cetirizine (zyrTEC) 10 MG tablet, Take 10 mg by mouth Daily., Disp: , Rfl:   •  gabapentin (NEURONTIN) 300 MG  "capsule, Take 1 capsule by mouth 3 (Three) Times a Day., Disp: 90 capsule, Rfl: 0  •  losartan (COZAAR) 50 MG tablet, losartan 50 mg tablet  TAKE 1 TABLET BY MOUTH ONCE DAILY, Disp: , Rfl:   •  traZODone (DESYREL) 100 MG tablet, trazodone 100 mg tablet  TAKE 1 TABLET BY MOUTH AT BEDTIME AS NEEDED, Disp: , Rfl:   •  [START ON 6/27/2022] gabapentin (NEURONTIN) 100 MG capsule, Take 1 capsule by mouth 3 (Three) Times a Day., Disp: 90 capsule, Rfl: 0    Review of Systems   Musculoskeletal: Positive for arthralgias, back pain and gait problem. Negative for myalgias.   Neurological: Negative for weakness and numbness.       Vitals:    05/27/22 0837   BP: 131/81   Pulse: 96   Resp: 16   Temp: 96.9 °F (36.1 °C)   SpO2: 99%   Weight: 89.4 kg (197 lb)   Height: 165.1 cm (65\")   PainSc:   4       Objective   Physical Exam  Vitals and nursing note reviewed.   Constitutional:       General: She is not in acute distress.     Appearance: Normal appearance. She is well-developed. She is obese.   Neck:      Trachea: No tracheal deviation.   Musculoskeletal:      Comments: Lumbar Spine Exam:  Tender to palpation over the lumbar paraspinal musculature Yes  Limited range of motion secondary to pain Yes  Facet loading positive: Equivocal  Facets tender to palpation: Equivocal  Straight leg raise test positive: left       Neurological:      Mental Status: She is alert.      Sensory: No sensory deficit.      Motor: No weakness.           Assessment & Plan   Problems Addressed this Visit    None     Visit Diagnoses     Displacement of lumbar intervertebral disc without myelopathy    -  Primary      Diagnoses       Codes Comments    Displacement of lumbar intervertebral disc without myelopathy    -  Primary ICD-10-CM: M51.26  ICD-9-CM: 722.10           Plan:  1. Excellent benefit from lumbar epidural steroid injection  2. Will refill gabapentin 300 mg 3 times daily for 1 month  3. Will wean gabapentin next month  --- Follow-up 2 months     "       INSPECT REPORT    As part of the patient's treatment plan, I may be prescribing controlled substances. The patient has been made aware of appropriate use of such medications, including potential risk of somnolence, limited ability to drive and/or work safely, and the potential for dependence or overdose. It has also bee made clear that these medications are for use by this patient only, without concomitant use of alcohol or other substances unless prescribed.     Patient has completed prescribing agreement detailing terms of continued prescribing of controlled substances, including monitoring EVELYN reports, urine drug screening, and pill counts if necessary. The patient is aware that inappropriate use will results in cessation of prescribing such medications.    INSPECT report has been reviewed and scanned into the patient's chart.    As the clinician, I personally reviewed the INSPECT from 5/25/2022.    History and physical exam exhibit continued safe and appropriate use of controlled substances.      EMR Dragon/Transcription disclaimer:   Much of this encounter note is an electronic transcription/translation of spoken language to printed text. The electronic translation of spoken language may permit erroneous, or at times, nonsensical words or phrases to be inadvertently transcribed; Although I have reviewed the note for such errors, some may still exist.

## 2022-07-27 ENCOUNTER — TELEPHONE (OUTPATIENT)
Dept: PAIN MEDICINE | Facility: CLINIC | Age: 62
End: 2022-07-27

## 2022-07-27 NOTE — TELEPHONE ENCOUNTER
Caller: HARI CARR    Relationship: SELF    Best call back number: [phone]    Requested Prescriptions:   Requested Prescriptions      No prescriptions requested or ordered in this encounter   GABAPENTIN     Pharmacy where request should be sent:  [facility]      Does the patient have less than a 3 day supply:  [x] Yes  [] No    Dioni Rodríguez Rep   07/27/22 15:19 EDT           PATIENT WANTS TO SPEAK WITH SOMEONE REGARDING HER ER VISIT AND WHAT MEDICATION SHES TAKING NOW

## 2022-07-29 ENCOUNTER — OFFICE VISIT (OUTPATIENT)
Dept: PAIN MEDICINE | Facility: CLINIC | Age: 62
End: 2022-07-29

## 2022-07-29 VITALS
HEART RATE: 87 BPM | DIASTOLIC BLOOD PRESSURE: 79 MMHG | HEIGHT: 65 IN | OXYGEN SATURATION: 98 % | BODY MASS INDEX: 32.82 KG/M2 | WEIGHT: 197 LBS | RESPIRATION RATE: 16 BRPM | SYSTOLIC BLOOD PRESSURE: 136 MMHG

## 2022-07-29 DIAGNOSIS — M51.26 DISPLACEMENT OF LUMBAR INTERVERTEBRAL DISC WITHOUT MYELOPATHY: Primary | ICD-10-CM

## 2022-07-29 PROCEDURE — G0463 HOSPITAL OUTPT CLINIC VISIT: HCPCS | Performed by: STUDENT IN AN ORGANIZED HEALTH CARE EDUCATION/TRAINING PROGRAM

## 2022-07-29 PROCEDURE — 99214 OFFICE O/P EST MOD 30 MIN: CPT | Performed by: STUDENT IN AN ORGANIZED HEALTH CARE EDUCATION/TRAINING PROGRAM

## 2022-07-29 RX ORDER — GABAPENTIN 300 MG/1
300 CAPSULE ORAL 3 TIMES DAILY
Qty: 90 CAPSULE | Refills: 2 | Status: SHIPPED | OUTPATIENT
Start: 2022-07-29 | End: 2022-11-11 | Stop reason: SDUPTHER

## 2022-07-29 RX ORDER — IBUPROFEN 600 MG/1
600 TABLET ORAL EVERY 6 HOURS PRN
COMMUNITY

## 2022-07-29 RX ORDER — AMOXICILLIN 500 MG/1
1000 CAPSULE ORAL 2 TIMES DAILY
COMMUNITY
End: 2022-09-08

## 2022-07-29 NOTE — PROGRESS NOTES
CHIEF COMPLAINT  Chief Complaint   Patient presents with   • Back Pain   • Leg Pain     Gabapentin LD 7/29/22  Codeine cough syrup 7/28/22       Primary Care  MainFranco MD    Subjective   Vivi Spence is a 62 y.o. female  who presents for lumbar radiculopathy.  She describes pain in the low back which radiates into the left lower extremity especially in the lateral aspect of the left leg into the left calf.  She has been undergoing chiropractic care but she did not experience any significant relief.  Her chiropractor ordered an MRI showing disc displacement at L5-S1 with foraminal stenosis and contact of the exiting S1 nerve roots which likely explains her pain.  She describes a sharp, stabbing pain which shoots into the buttock and down the left leg.  She denies any significant numbness or weakness or red flag symptoms such as loss of bowel or bladder.    Back Pain  Associated symptoms include leg pain. Pertinent negatives include no numbness or weakness.   Leg Pain   Pertinent negatives include no numbness.        Location: Low back with radiation in the left lower extremity  Onset: Several months ago  Duration: Progressively worsening  Timing: Constant throughout the day  Quality: Sharp, stabbing  Severity: Today: 4       Last Week: 4       Worst: 10  Modifying Factors: The pain is worse with any type of movement and activity and slightly improved with rest    Physical Therapy: She is currently doing chiropractic care    Interval Update 07/29/2022: Continues to do well.  Started to have a mild return of symptoms as before.  Continues to do well with gabapentin 300 mg 3 times daily    The following portions of the patient's history were reviewed and updated as appropriate: allergies, current medications, past family history, past medical history, past social history, past surgical history and problem list.      Current Outpatient Medications:   •  albuterol sulfate  (90 Base) MCG/ACT inhaler,  "Ventolin HFA 90 mcg/actuation aerosol inhaler  as needed, Disp: , Rfl:   •  amoxicillin (AMOXIL) 500 MG capsule, Take 1,000 mg by mouth 2 (Two) Times a Day., Disp: , Rfl:   •  cetirizine (zyrTEC) 10 MG tablet, Take 10 mg by mouth Daily., Disp: , Rfl:   •  gabapentin (NEURONTIN) 300 MG capsule, Take 1 capsule by mouth 3 (Three) Times a Day., Disp: 90 capsule, Rfl: 2  •  ibuprofen (ADVIL,MOTRIN) 600 MG tablet, Take 600 mg by mouth Every 6 (Six) Hours As Needed for Mild Pain ., Disp: , Rfl:   •  losartan (COZAAR) 50 MG tablet, losartan 50 mg tablet  TAKE 1 TABLET BY MOUTH ONCE DAILY, Disp: , Rfl:   •  traZODone (DESYREL) 100 MG tablet, trazodone 100 mg tablet  TAKE 1 TABLET BY MOUTH AT BEDTIME AS NEEDED, Disp: , Rfl:     Review of Systems   Musculoskeletal: Positive for arthralgias, back pain and gait problem. Negative for myalgias.   Neurological: Negative for weakness and numbness.       Vitals:    07/29/22 0819   BP: 136/79   Pulse: 87   Resp: 16   SpO2: 98%   Weight: 89.4 kg (197 lb)   Height: 165.1 cm (65\")   PainSc:   5       Objective   Physical Exam  Vitals and nursing note reviewed.   Constitutional:       General: She is not in acute distress.     Appearance: Normal appearance. She is well-developed. She is obese.   Neck:      Trachea: No tracheal deviation.   Musculoskeletal:      Comments: Lumbar Spine Exam:  Tender to palpation over the lumbar paraspinal musculature Yes  Limited range of motion secondary to pain Yes  Facet loading positive: Equivocal  Facets tender to palpation: Equivocal  Straight leg raise test positive: left       Neurological:      Mental Status: She is alert.      Sensory: No sensory deficit.      Motor: No weakness.           Assessment & Plan   Problems Addressed this Visit    None     Visit Diagnoses     Displacement of lumbar intervertebral disc without myelopathy    -  Primary    Relevant Orders    Epidural Block      Diagnoses       Codes Comments    Displacement of lumbar " intervertebral disc without myelopathy    -  Primary ICD-10-CM: M51.26  ICD-9-CM: 722.10           Plan:  1. Excellent benefit from lumbar epidural steroid injection  2. Will refill gabapentin 300 mg 3 times daily for 1 month  3. Repeat lumbar epidural in approximately 1 month prior to her vacation  --- Follow-up 1 month for repeat lumbar epidural steroid injection           INSPECT REPORT    As part of the patient's treatment plan, I may be prescribing controlled substances. The patient has been made aware of appropriate use of such medications, including potential risk of somnolence, limited ability to drive and/or work safely, and the potential for dependence or overdose. It has also bee made clear that these medications are for use by this patient only, without concomitant use of alcohol or other substances unless prescribed.     Patient has completed prescribing agreement detailing terms of continued prescribing of controlled substances, including monitoring EVELYN reports, urine drug screening, and pill counts if necessary. The patient is aware that inappropriate use will results in cessation of prescribing such medications.    INSPECT report has been reviewed and scanned into the patient's chart.    As the clinician, I personally reviewed the INSPECT from 7/27/2022.    History and physical exam exhibit continued safe and appropriate use of controlled substances.      EMR Dragon/Transcription disclaimer:   Much of this encounter note is an electronic transcription/translation of spoken language to printed text. The electronic translation of spoken language may permit erroneous, or at times, nonsensical words or phrases to be inadvertently transcribed; Although I have reviewed the note for such errors, some may still exist.

## 2022-09-08 ENCOUNTER — HOSPITAL ENCOUNTER (OUTPATIENT)
Dept: GENERAL RADIOLOGY | Facility: HOSPITAL | Age: 62
Discharge: HOME | End: 2022-09-08

## 2022-09-08 ENCOUNTER — HOSPITAL ENCOUNTER (OUTPATIENT)
Dept: PAIN MEDICINE | Facility: HOSPITAL | Age: 62
Discharge: HOME | End: 2022-09-08

## 2022-09-08 VITALS
TEMPERATURE: 98.1 F | OXYGEN SATURATION: 99 % | RESPIRATION RATE: 16 BRPM | DIASTOLIC BLOOD PRESSURE: 85 MMHG | HEART RATE: 66 BPM | SYSTOLIC BLOOD PRESSURE: 158 MMHG

## 2022-09-08 DIAGNOSIS — M51.26 DISPLACEMENT OF LUMBAR INTERVERTEBRAL DISC WITHOUT MYELOPATHY: Primary | ICD-10-CM

## 2022-09-08 DIAGNOSIS — R52 PAIN: ICD-10-CM

## 2022-09-08 PROCEDURE — 25010000002 METHYLPREDNISOLONE PER 40 MG: Performed by: STUDENT IN AN ORGANIZED HEALTH CARE EDUCATION/TRAINING PROGRAM

## 2022-09-08 PROCEDURE — 77003 FLUOROGUIDE FOR SPINE INJECT: CPT

## 2022-09-08 PROCEDURE — 0 IOPAMIDOL 41 % SOLUTION: Performed by: STUDENT IN AN ORGANIZED HEALTH CARE EDUCATION/TRAINING PROGRAM

## 2022-09-08 PROCEDURE — 62323 NJX INTERLAMINAR LMBR/SAC: CPT | Performed by: STUDENT IN AN ORGANIZED HEALTH CARE EDUCATION/TRAINING PROGRAM

## 2022-09-08 RX ORDER — METHYLPREDNISOLONE ACETATE 40 MG/ML
40 INJECTION, SUSPENSION INTRA-ARTICULAR; INTRALESIONAL; INTRAMUSCULAR; SOFT TISSUE ONCE
Status: COMPLETED | OUTPATIENT
Start: 2022-09-08 | End: 2022-09-08

## 2022-09-08 RX ORDER — MECLIZINE HYDROCHLORIDE 25 MG/1
25 TABLET ORAL 3 TIMES DAILY PRN
COMMUNITY
Start: 2022-08-31

## 2022-09-08 RX ORDER — BUPIVACAINE HYDROCHLORIDE 2.5 MG/ML
10 INJECTION, SOLUTION EPIDURAL; INFILTRATION; INTRACAUDAL ONCE
Status: DISCONTINUED | OUTPATIENT
Start: 2022-09-08 | End: 2022-09-08

## 2022-09-08 RX ADMIN — METHYLPREDNISOLONE ACETATE 40 MG: 40 INJECTION, SUSPENSION INTRA-ARTICULAR; INTRALESIONAL; INTRAMUSCULAR; SOFT TISSUE at 08:18

## 2022-09-08 RX ADMIN — IOPAMIDOL 3 ML: 408 INJECTION, SOLUTION INTRATHECAL at 08:16

## 2022-09-08 NOTE — PROCEDURES
Lumbar Epidural Steroid Injection  [facility]    PREOPERATIVE DIAGNOSIS:   Chronic low back pain, Lumbar Degenerative Disc Disease and Lumbar Disc Displacement  POSTOPERATIVE DIAGNOSIS:  Same as preop diagnosis    PROCEDURE:   Lumbar Epidural Steroid Injection, Therapeutic Translaminar Injection, with epidurogram, at  L5/S1 level    PRE-PROCEDURE DISCUSSION WITH PATIENT:    Risks and complications were discussed with the patient prior to starting the procedure and informed consent was obtained.  We discussed various topics including but not limited to bleeding, infection, injury, paralysis, nerve injury, dural puncture, coma, death, worsening of clinical picture, lack of pain relief, and postprocedural soreness.    SURGEON:  Filippo Marin MD    REASON FOR PROCEDURE:    Radiating pattern of pain is likely consistent with degenerative changes in the area., Radicular pain pattern seems consistent with this dermatome. and Nerve root provocation positivity is noted.      SEDATION:  Patient declined administration of moderate sedation    ANESTHETIC:  Marcaine 0.25%  STEROID:   Methylprednisolone (DEPO MEDROL) 40mg/ml    DESCRIPTON OF PROCEDURE:    After obtaining informed consent, I.V. was not started in the preop area.   The patient was taken to the operating room and placed in the prone position.  All pressure points were well padded.  The lumbar spine area was prepped with Chloraprep and draped in a sterile fashion.  Under fluoroscopic guidance, the above mentioned interlaminar space was identified. Skin and subcutaneous tissues were anesthetized with 1% lidocaine in the middle of the space. A Tuohy needle was introduced through the skin and advanced to this interlaminar space and into the epidural space under fluoroscopic guidance and verified with loss-of-resistance technique to air.  After confirming the position of the needle with the fluoroscope with all the views, and after aspiration was confirmed  negative for blood and CSF, 1.5 mL of Omnipaque was injected.  After seeing appropriate epidurogram with lateral and PA views, a total of 4 cc solution was injected, consisting of 3cc of local anesthetic as above, with normal saline and injectable steroid as above.     ESTIMATED BLOOD LOSS:  <5 mL  SPECIMENS:  None    COMPLICATIONS:     No complications were noted., There was no indication of vascular uptake on live injection of contrast dye., There was no indication of intrathecal uptake on live injection of contrast dye. and There was not any evidence of dural puncture.      TOLERANCE & DISCHARGE CONDITION:    The patient tolerated the procedure well.  The patient was transported to the recovery area without difficulties.  The patient was discharged to home under the care of family in stable and satisfactory condition.    PLAN OF CARE:  1. The patient was given our standard instruction sheet.  2. The patient will Return to clinic in 8 wks  3. The patient will resume all medications as per the medication reconciliation sheet.

## 2022-11-11 ENCOUNTER — OFFICE VISIT (OUTPATIENT)
Dept: PAIN MEDICINE | Facility: CLINIC | Age: 62
End: 2022-11-11

## 2022-11-11 VITALS
RESPIRATION RATE: 16 BRPM | HEART RATE: 81 BPM | DIASTOLIC BLOOD PRESSURE: 82 MMHG | OXYGEN SATURATION: 97 % | SYSTOLIC BLOOD PRESSURE: 139 MMHG

## 2022-11-11 DIAGNOSIS — M51.26 DISPLACEMENT OF LUMBAR INTERVERTEBRAL DISC WITHOUT MYELOPATHY: Primary | ICD-10-CM

## 2022-11-11 DIAGNOSIS — M54.16 LUMBAR RADICULOPATHY: ICD-10-CM

## 2022-11-11 PROCEDURE — G0463 HOSPITAL OUTPT CLINIC VISIT: HCPCS | Performed by: STUDENT IN AN ORGANIZED HEALTH CARE EDUCATION/TRAINING PROGRAM

## 2022-11-11 PROCEDURE — 99214 OFFICE O/P EST MOD 30 MIN: CPT | Performed by: STUDENT IN AN ORGANIZED HEALTH CARE EDUCATION/TRAINING PROGRAM

## 2022-11-11 RX ORDER — GABAPENTIN 300 MG/1
300 CAPSULE ORAL 3 TIMES DAILY
Qty: 90 CAPSULE | Refills: 2 | Status: SHIPPED | OUTPATIENT
Start: 2022-11-11 | End: 2023-03-23 | Stop reason: SDUPTHER

## 2022-11-11 NOTE — PROGRESS NOTES
CHIEF COMPLAINT  Chief Complaint   Patient presents with   • Back Pain     2 month f/u for displacement of lumbar intervertebral disc No Narcotics        Primary Care  Main, Franco Meraz MD    Subjective   Vivi Spence is a 62 y.o. female  who presents for lumbar radiculopathy.  She describes pain in the low back which radiates into the left lower extremity especially in the lateral aspect of the left leg into the left calf.  She has been undergoing chiropractic care but she did not experience any significant relief.  Her chiropractor ordered an MRI showing disc displacement at L5-S1 with foraminal stenosis and contact of the exiting S1 nerve roots which likely explains her pain.  She describes a sharp, stabbing pain which shoots into the buttock and down the left leg.  She denies any significant numbness or weakness or red flag symptoms such as loss of bowel or bladder.    Back Pain  Associated symptoms include leg pain. Pertinent negatives include no numbness or weakness.   Leg Pain   Pertinent negatives include no numbness.        Location: Low back with radiation in the left lower extremity  Onset: Several months ago  Duration: Progressively worsening  Timing: Constant throughout the day  Quality: Sharp, stabbing  Severity: Today: 4       Last Week: 4       Worst: 10  Modifying Factors: The pain is worse with any type of movement and activity and slightly improved with rest    Physical Therapy: She is currently doing chiropractic care    Interval Update 11/11/2022: She again reports fairly good benefit from her most recent lumbar epidural steroid injection.  Otherwise, no new changes.  Needs refill on gabapentin.    The following portions of the patient's history were reviewed and updated as appropriate: allergies, current medications, past family history, past medical history, past social history, past surgical history and problem list.      Current Outpatient Medications:   •  albuterol sulfate   (90 Base) MCG/ACT inhaler, Ventolin HFA 90 mcg/actuation aerosol inhaler  as needed, Disp: , Rfl:   •  cetirizine (zyrTEC) 10 MG tablet, Take 10 mg by mouth Daily., Disp: , Rfl:   •  gabapentin (NEURONTIN) 300 MG capsule, Take 1 capsule by mouth 3 (Three) Times a Day., Disp: 90 capsule, Rfl: 2  •  ibuprofen (ADVIL,MOTRIN) 600 MG tablet, Take 600 mg by mouth Every 6 (Six) Hours As Needed for Mild Pain ., Disp: , Rfl:   •  losartan (COZAAR) 50 MG tablet, losartan 50 mg tablet  TAKE 1 TABLET BY MOUTH ONCE DAILY, Disp: , Rfl:   •  meclizine (ANTIVERT) 25 MG tablet, Take 25 mg by mouth 3 (Three) Times a Day As Needed., Disp: , Rfl:   •  traZODone (DESYREL) 100 MG tablet, trazodone 100 mg tablet  TAKE 1 TABLET BY MOUTH AT BEDTIME AS NEEDED, Disp: , Rfl:     Review of Systems   Musculoskeletal: Positive for arthralgias, back pain and gait problem. Negative for myalgias.   Neurological: Negative for weakness and numbness.       Vitals:    11/11/22 0853   BP: 139/82   Pulse: 81   Resp: 16   SpO2: 97%   PainSc:   4       Objective   Physical Exam  Vitals and nursing note reviewed.   Constitutional:       General: She is not in acute distress.     Appearance: Normal appearance. She is well-developed. She is obese.   Neck:      Trachea: No tracheal deviation.   Musculoskeletal:      Comments: Lumbar Spine Exam:  Tender to palpation over the lumbar paraspinal musculature Yes  Limited range of motion secondary to pain Yes  Facet loading positive: Equivocal  Facets tender to palpation: Equivocal  Straight leg raise test positive: left       Neurological:      Mental Status: She is alert.      Sensory: No sensory deficit.      Motor: No weakness.           Assessment & Plan   Problems Addressed this Visit    None  Visit Diagnoses     Displacement of lumbar intervertebral disc without myelopathy    -  Primary    Relevant Orders    Epidural Block    Lumbar radiculopathy        Relevant Orders    Epidural Block      Diagnoses        Codes Comments    Displacement of lumbar intervertebral disc without myelopathy    -  Primary ICD-10-CM: M51.26  ICD-9-CM: 722.10     Lumbar radiculopathy     ICD-10-CM: M54.16  ICD-9-CM: 724.4           Plan:  1. Excellent benefit from lumbar epidural steroid injection  2. Continue gabapentin 300 mg 3 times daily  3. Plan to repeat lumbar epidural in approximately 2 to 3 months  --- Follow-up 2 months for lumbar epidural steroid injection           INSPECT REPORT    As part of the patient's treatment plan, I may be prescribing controlled substances. The patient has been made aware of appropriate use of such medications, including potential risk of somnolence, limited ability to drive and/or work safely, and the potential for dependence or overdose. It has also bee made clear that these medications are for use by this patient only, without concomitant use of alcohol or other substances unless prescribed.     Patient has completed prescribing agreement detailing terms of continued prescribing of controlled substances, including monitoring EVELYN reports, urine drug screening, and pill counts if necessary. The patient is aware that inappropriate use will results in cessation of prescribing such medications.    INSPECT report has been reviewed and scanned into the patient's chart.    As the clinician, I personally reviewed the INSPECT from 11/9/2022.    History and physical exam exhibit continued safe and appropriate use of controlled substances.      EMR Dragon/Transcription disclaimer:   Much of this encounter note is an electronic transcription/translation of spoken language to printed text. The electronic translation of spoken language may permit erroneous, or at times, nonsensical words or phrases to be inadvertently transcribed; Although I have reviewed the note for such errors, some may still exist.

## 2023-01-19 ENCOUNTER — HOSPITAL ENCOUNTER (OUTPATIENT)
Dept: PAIN MEDICINE | Facility: HOSPITAL | Age: 63
Discharge: HOME | End: 2023-01-19
Payer: COMMERCIAL

## 2023-01-19 ENCOUNTER — HOSPITAL ENCOUNTER (OUTPATIENT)
Dept: GENERAL RADIOLOGY | Facility: HOSPITAL | Age: 63
Discharge: HOME | End: 2023-01-19
Payer: COMMERCIAL

## 2023-01-19 VITALS
HEART RATE: 85 BPM | DIASTOLIC BLOOD PRESSURE: 76 MMHG | SYSTOLIC BLOOD PRESSURE: 159 MMHG | RESPIRATION RATE: 16 BRPM | TEMPERATURE: 97.1 F | OXYGEN SATURATION: 97 %

## 2023-01-19 DIAGNOSIS — M51.26 DISPLACEMENT OF LUMBAR INTERVERTEBRAL DISC WITHOUT MYELOPATHY: ICD-10-CM

## 2023-01-19 DIAGNOSIS — R52 PAIN: ICD-10-CM

## 2023-01-19 DIAGNOSIS — M54.16 LUMBAR RADICULOPATHY: Primary | ICD-10-CM

## 2023-01-19 PROCEDURE — 0 IOPAMIDOL 41 % SOLUTION: Performed by: STUDENT IN AN ORGANIZED HEALTH CARE EDUCATION/TRAINING PROGRAM

## 2023-01-19 PROCEDURE — 77003 FLUOROGUIDE FOR SPINE INJECT: CPT

## 2023-01-19 PROCEDURE — 62323 NJX INTERLAMINAR LMBR/SAC: CPT | Performed by: STUDENT IN AN ORGANIZED HEALTH CARE EDUCATION/TRAINING PROGRAM

## 2023-01-19 PROCEDURE — 25010000002 METHYLPREDNISOLONE PER 40 MG: Performed by: STUDENT IN AN ORGANIZED HEALTH CARE EDUCATION/TRAINING PROGRAM

## 2023-01-19 RX ORDER — METHYLPREDNISOLONE ACETATE 40 MG/ML
40 INJECTION, SUSPENSION INTRA-ARTICULAR; INTRALESIONAL; INTRAMUSCULAR; SOFT TISSUE ONCE
Status: COMPLETED | OUTPATIENT
Start: 2023-01-19 | End: 2023-01-19

## 2023-01-19 RX ORDER — BUPIVACAINE HYDROCHLORIDE 2.5 MG/ML
10 INJECTION, SOLUTION EPIDURAL; INFILTRATION; INTRACAUDAL ONCE
Status: COMPLETED | OUTPATIENT
Start: 2023-01-19 | End: 2023-01-19

## 2023-01-19 RX ADMIN — IOPAMIDOL 3 ML: 408 INJECTION, SOLUTION INTRATHECAL at 08:24

## 2023-01-19 RX ADMIN — BUPIVACAINE HYDROCHLORIDE 10 ML: 2.5 INJECTION, SOLUTION EPIDURAL; INFILTRATION; INTRACAUDAL; PERINEURAL at 08:24

## 2023-01-19 RX ADMIN — METHYLPREDNISOLONE ACETATE 40 MG: 40 INJECTION, SUSPENSION INTRA-ARTICULAR; INTRALESIONAL; INTRAMUSCULAR; SOFT TISSUE at 08:24

## 2023-01-19 NOTE — PROCEDURES
Lumbar Epidural Steroid Injection  [facility]    PREOPERATIVE DIAGNOSIS:   Chronic low back pain, Lumbar Degenerative Disc Disease and Lumbar Disc Displacement  POSTOPERATIVE DIAGNOSIS:  Same as preop diagnosis    PROCEDURE:   Lumbar Epidural Steroid Injection, Therapeutic Translaminar Injection, with epidurogram, at  L5/S1 level    PRE-PROCEDURE DISCUSSION WITH PATIENT:    Risks and complications were discussed with the patient prior to starting the procedure and informed consent was obtained.  We discussed various topics including but not limited to bleeding, infection, injury, paralysis, nerve injury, dural puncture, coma, death, worsening of clinical picture, lack of pain relief, and postprocedural soreness.    SURGEON:  Filippo Marin MD    REASON FOR PROCEDURE:    Radiating pattern of pain is likely consistent with degenerative changes in the area., Radicular pain pattern seems consistent with this dermatome. and Nerve root provocation positivity is noted.      SEDATION:  Patient declined administration of moderate sedation    ANESTHETIC:  Marcaine 0.25%  STEROID:   Methylprednisolone (DEPO MEDROL) 40mg/ml    DESCRIPTON OF PROCEDURE:    After obtaining informed consent, I.V. was not started in the preop area.   The patient was taken to the operating room and placed in the prone position.  All pressure points were well padded.  The lumbar spine area was prepped with Chloraprep and draped in a sterile fashion.  Under fluoroscopic guidance, the above mentioned interlaminar space was identified. Skin and subcutaneous tissues were anesthetized with 1% lidocaine in the middle of the space. A Tuohy needle was introduced through the skin and advanced to this interlaminar space and into the epidural space under fluoroscopic guidance and verified with loss-of-resistance technique to air.  After confirming the position of the needle with the fluoroscope with all the views, and after aspiration was confirmed  negative for blood and CSF, 1.5 mL of Omnipaque was injected.  After seeing appropriate epidurogram with lateral and PA views, a total of 4 cc solution was injected, consisting of 3cc of local anesthetic as above, with normal saline and injectable steroid as above.     ESTIMATED BLOOD LOSS:  <5 mL  SPECIMENS:  None    COMPLICATIONS:     No complications were noted., There was no indication of vascular uptake on live injection of contrast dye., There was no indication of intrathecal uptake on live injection of contrast dye. and There was not any evidence of dural puncture.      TOLERANCE & DISCHARGE CONDITION:    The patient tolerated the procedure well.  The patient was transported to the recovery area without difficulties.  The patient was discharged to home under the care of family in stable and satisfactory condition.    PLAN OF CARE:  1. The patient was given our standard instruction sheet.  2. The patient will Return to clinic in 8 wks  3. The patient will resume all medications as per the medication reconciliation sheet.

## 2023-03-23 ENCOUNTER — OFFICE VISIT (OUTPATIENT)
Dept: PAIN MEDICINE | Facility: CLINIC | Age: 63
End: 2023-03-23
Payer: COMMERCIAL

## 2023-03-23 VITALS
DIASTOLIC BLOOD PRESSURE: 83 MMHG | RESPIRATION RATE: 16 BRPM | OXYGEN SATURATION: 97 % | SYSTOLIC BLOOD PRESSURE: 151 MMHG | HEART RATE: 104 BPM

## 2023-03-23 DIAGNOSIS — M54.16 LUMBAR RADICULOPATHY: Primary | ICD-10-CM

## 2023-03-23 PROCEDURE — 99213 OFFICE O/P EST LOW 20 MIN: CPT | Performed by: STUDENT IN AN ORGANIZED HEALTH CARE EDUCATION/TRAINING PROGRAM

## 2023-03-23 PROCEDURE — G0463 HOSPITAL OUTPT CLINIC VISIT: HCPCS | Performed by: STUDENT IN AN ORGANIZED HEALTH CARE EDUCATION/TRAINING PROGRAM

## 2023-03-23 RX ORDER — GABAPENTIN 300 MG/1
300 CAPSULE ORAL 3 TIMES DAILY
Qty: 90 CAPSULE | Refills: 2 | Status: SHIPPED | OUTPATIENT
Start: 2023-03-23

## 2023-03-23 NOTE — PROGRESS NOTES
CHIEF COMPLAINT  Chief Complaint   Patient presents with   • Back Pain     2 month f/u from Lumbar epidural  CC  Displacement of lumbar intervertebral disc.  No Narcotics        Primary Care  Main, Franco Meraz MD    Subjective   Vivi Spence is a 63 y.o. female  who presents for lumbar radiculopathy.  She describes pain in the low back which radiates into the left lower extremity especially in the lateral aspect of the left leg into the left calf.  She has been undergoing chiropractic care but she did not experience any significant relief.  Her chiropractor ordered an MRI showing disc displacement at L5-S1 with foraminal stenosis and contact of the exiting S1 nerve roots which likely explains her pain.  She describes a sharp, stabbing pain which shoots into the buttock and down the left leg.  She denies any significant numbness or weakness or red flag symptoms such as loss of bowel or bladder.    Back Pain  Associated symptoms include leg pain. Pertinent negatives include no numbness or weakness.   Leg Pain   Pertinent negatives include no numbness.        Location: Low back with radiation in the left lower extremity  Onset: Several months ago  Duration: Progressively worsening  Timing: Constant throughout the day  Quality: Sharp, stabbing  Severity: Today: 3       Last Week: 4       Worst: 10  Modifying Factors: The pain is worse with any type of movement and activity and slightly improved with rest    Physical Therapy: She is currently doing chiropractic care    Interval Update 03/23/2023: Again with excellent benefit from the lumbar epidural.  Overall, she feels that she is doing very well.    The following portions of the patient's history were reviewed and updated as appropriate: allergies, current medications, past family history, past medical history, past social history, past surgical history and problem list.      Current Outpatient Medications:   •  albuterol sulfate  (90 Base) MCG/ACT  inhaler, Ventolin HFA 90 mcg/actuation aerosol inhaler  as needed, Disp: , Rfl:   •  cetirizine (zyrTEC) 10 MG tablet, Take 1 tablet by mouth Daily., Disp: , Rfl:   •  gabapentin (NEURONTIN) 300 MG capsule, Take 1 capsule by mouth 3 (Three) Times a Day., Disp: 90 capsule, Rfl: 2  •  ibuprofen (ADVIL,MOTRIN) 600 MG tablet, Take 1 tablet by mouth Every 6 (Six) Hours As Needed for Mild Pain., Disp: , Rfl:   •  losartan (COZAAR) 50 MG tablet, losartan 50 mg tablet  TAKE 1 TABLET BY MOUTH ONCE DAILY, Disp: , Rfl:   •  meclizine (ANTIVERT) 25 MG tablet, Take 1 tablet by mouth 3 (Three) Times a Day As Needed., Disp: , Rfl:   •  traZODone (DESYREL) 100 MG tablet, trazodone 100 mg tablet  TAKE 1 TABLET BY MOUTH AT BEDTIME AS NEEDED, Disp: , Rfl:     Review of Systems   Musculoskeletal: Positive for arthralgias, back pain and gait problem. Negative for myalgias.   Neurological: Negative for weakness and numbness.       Vitals:    03/23/23 0758   BP: 151/83   Pulse: 104   Resp: 16   SpO2: 97%   PainSc:   3       Objective   Physical Exam  Vitals and nursing note reviewed.   Constitutional:       General: She is not in acute distress.     Appearance: Normal appearance. She is well-developed. She is obese.   Neck:      Trachea: No tracheal deviation.   Musculoskeletal:      Comments: Lumbar Spine Exam:  Tender to palpation over the lumbar paraspinal musculature Yes  Limited range of motion secondary to pain Yes  Facet loading positive: Equivocal  Facets tender to palpation: Equivocal  Straight leg raise test positive: left       Neurological:      Mental Status: She is alert.      Sensory: No sensory deficit.      Motor: No weakness.           Assessment & Plan   Problems Addressed this Visit    None  Visit Diagnoses     Lumbar radiculopathy    -  Primary      Diagnoses       Codes Comments    Lumbar radiculopathy    -  Primary ICD-10-CM: M54.16  ICD-9-CM: 724.4           Plan:  1. Excellent benefit from lumbar epidural steroid  injection  2. Continues to get good benefit with gabapentin  3. She can follow-up as needed if she is doing very well  --- Follow-up as needed           INSPECT REPORT    As part of the patient's treatment plan, I may be prescribing controlled substances. The patient has been made aware of appropriate use of such medications, including potential risk of somnolence, limited ability to drive and/or work safely, and the potential for dependence or overdose. It has also bee made clear that these medications are for use by this patient only, without concomitant use of alcohol or other substances unless prescribed.     Patient has completed prescribing agreement detailing terms of continued prescribing of controlled substances, including monitoring EVELYN reports, urine drug screening, and pill counts if necessary. The patient is aware that inappropriate use will results in cessation of prescribing such medications.    INSPECT report has been reviewed and scanned into the patient's chart.    As the clinician, I personally reviewed the INSPECT from 3/22/2023.    History and physical exam exhibit continued safe and appropriate use of controlled substances.      EMR Dragon/Transcription disclaimer:   Much of this encounter note is an electronic transcription/translation of spoken language to printed text. The electronic translation of spoken language may permit erroneous, or at times, nonsensical words or phrases to be inadvertently transcribed; Although I have reviewed the note for such errors, some may still exist.

## 2024-04-12 ENCOUNTER — OFFICE VISIT (OUTPATIENT)
Dept: PAIN MEDICINE | Facility: CLINIC | Age: 64
End: 2024-04-12
Payer: COMMERCIAL

## 2024-04-12 VITALS
SYSTOLIC BLOOD PRESSURE: 136 MMHG | HEART RATE: 88 BPM | RESPIRATION RATE: 16 BRPM | DIASTOLIC BLOOD PRESSURE: 84 MMHG | OXYGEN SATURATION: 96 %

## 2024-04-12 DIAGNOSIS — M54.16 LUMBAR RADICULOPATHY: Primary | ICD-10-CM

## 2024-04-12 DIAGNOSIS — M99.53 INTERVERTEBRAL DISC STENOSIS OF NEURAL CANAL OF LUMBAR REGION: ICD-10-CM

## 2024-04-12 PROCEDURE — G0463 HOSPITAL OUTPT CLINIC VISIT: HCPCS | Performed by: STUDENT IN AN ORGANIZED HEALTH CARE EDUCATION/TRAINING PROGRAM

## 2024-04-12 RX ORDER — PANTOPRAZOLE SODIUM 40 MG/1
40 TABLET, DELAYED RELEASE ORAL
COMMUNITY
Start: 2024-02-24

## 2024-04-12 RX ORDER — GABAPENTIN 300 MG/1
300 CAPSULE ORAL 3 TIMES DAILY
Qty: 90 CAPSULE | Refills: 2 | Status: SHIPPED | OUTPATIENT
Start: 2024-04-12

## 2024-04-12 NOTE — PROGRESS NOTES
CHIEF COMPLAINT  Chief Complaint   Patient presents with    Back Pain     Last seen 1 year ago- PRN pt- Flare up of lumbar back pain and shooting pain down TERRA legs  Was prescribed Tramadol 1 month ago from work accident but pt reported she took it last on 4/9/24       Primary Care  Provider, No Known    Subjective   Vivi Spence is a 64 y.o. female  who presents for lumbar radiculopathy.  She describes pain in the low back which radiates into the left lower extremity especially in the lateral aspect of the left leg into the left calf.  She has been undergoing chiropractic care but she did not experience any significant relief.  Her chiropractor ordered an MRI showing disc displacement at L5-S1 with foraminal stenosis and contact of the exiting S1 nerve roots which likely explains her pain.  She describes a sharp, stabbing pain which shoots into the buttock and down the left leg.  She denies any significant numbness or weakness or red flag symptoms such as loss of bowel or bladder.    Back Pain  Associated symptoms include leg pain. Pertinent negatives include no numbness or weakness.   Leg Pain   Pertinent negatives include no numbness.        Location: Low back with radiation in the left lower extremity  Onset: Several months ago  Duration: Progressively worsening  Timing: Constant throughout the day  Quality: Sharp, stabbing  Severity: Today: 3       Last Week: 4       Worst: 10  Modifying Factors: The pain is worse with any type of movement and activity and slightly improved with rest    Physical Therapy: She is currently doing chiropractic care    Interval Update 04/12/2024: She presents after approximately 1 year.  She previously underwent LESI and had excellent relief of her pain.  Unfortunately, she has had a recurrence    The following portions of the patient's history were reviewed and updated as appropriate: allergies, current medications, past family history, past medical history, past social history,  past surgical history and problem list.      Current Outpatient Medications:     albuterol sulfate  (90 Base) MCG/ACT inhaler, Ventolin HFA 90 mcg/actuation aerosol inhaler  as needed, Disp: , Rfl:     cetirizine (zyrTEC) 10 MG tablet, Take 1 tablet by mouth Daily., Disp: , Rfl:     gabapentin (NEURONTIN) 300 MG capsule, Take 1 capsule by mouth 3 (Three) Times a Day., Disp: 90 capsule, Rfl: 2    ibuprofen (ADVIL,MOTRIN) 600 MG tablet, Take 1 tablet by mouth Every 6 (Six) Hours As Needed for Mild Pain., Disp: , Rfl:     losartan (COZAAR) 50 MG tablet, losartan 50 mg tablet  TAKE 1 TABLET BY MOUTH ONCE DAILY, Disp: , Rfl:     meclizine (ANTIVERT) 25 MG tablet, Take 1 tablet by mouth 3 (Three) Times a Day As Needed., Disp: , Rfl:     pantoprazole (PROTONIX) 40 MG EC tablet, Take 1 tablet by mouth., Disp: , Rfl:     traZODone (DESYREL) 100 MG tablet, trazodone 100 mg tablet  TAKE 1 TABLET BY MOUTH AT BEDTIME AS NEEDED, Disp: , Rfl:     Review of Systems   Musculoskeletal:  Positive for arthralgias, back pain and gait problem. Negative for myalgias.   Neurological:  Negative for weakness and numbness.       Vitals:    04/12/24 0940   BP: 136/84   Pulse: 88   Resp: 16   SpO2: 96%   PainSc:   8       Objective   Physical Exam  Vitals and nursing note reviewed.   Constitutional:       General: She is not in acute distress.     Appearance: Normal appearance. She is well-developed. She is obese.   Neck:      Trachea: No tracheal deviation.   Musculoskeletal:      Comments: Lumbar Spine Exam:  Tender to palpation over the lumbar paraspinal musculature Yes  Limited range of motion secondary to pain Yes  Facet loading positive: Equivocal  Facets tender to palpation: Equivocal  Straight leg raise test positive: left       Neurological:      Mental Status: She is alert.      Sensory: No sensory deficit.      Motor: No weakness.           Assessment & Plan   Problems Addressed this Visit    None  Visit Diagnoses       Lumbar  radiculopathy    -  Primary    Relevant Orders    Epidural Block    Intervertebral disc stenosis of neural canal of lumbar region        Relevant Orders    Epidural Block          Diagnoses         Codes Comments    Lumbar radiculopathy    -  Primary ICD-10-CM: M54.16  ICD-9-CM: 724.4     Intervertebral disc stenosis of neural canal of lumbar region     ICD-10-CM: M99.53  ICD-9-CM: 724.02             Plan:  Plan to repeat lumbar epidural steroid injection as she previously had excellent benefit  Continues to get good benefit with gabapentin.  Will refill    --- Follow-up next notable for repeat LESI           INSPECT REPORT    As part of the patient's treatment plan, I may be prescribing controlled substances. The patient has been made aware of appropriate use of such medications, including potential risk of somnolence, limited ability to drive and/or work safely, and the potential for dependence or overdose. It has also bee made clear that these medications are for use by this patient only, without concomitant use of alcohol or other substances unless prescribed.     Patient has completed prescribing agreement detailing terms of continued prescribing of controlled substances, including monitoring EVELYN reports, urine drug screening, and pill counts if necessary. The patient is aware that inappropriate use will results in cessation of prescribing such medications.    INSPECT report has been reviewed and scanned into the patient's chart.    As the clinician, I personally reviewed the INSPECT from 4/10/2024.    History and physical exam exhibit continued safe and appropriate use of controlled substances.      EMR Dragon/Transcription disclaimer:   Much of this encounter note is an electronic transcription/translation of spoken language to printed text. The electronic translation of spoken language may permit erroneous, or at times, nonsensical words or phrases to be inadvertently transcribed; Although I have reviewed  the note for such errors, some may still exist.

## 2024-05-02 ENCOUNTER — TELEPHONE (OUTPATIENT)
Dept: PAIN MEDICINE | Facility: CLINIC | Age: 64
End: 2024-05-02
Payer: COMMERCIAL

## 2024-05-02 NOTE — TELEPHONE ENCOUNTER
Caller: HARI CARR     Relationship to patient: PATIENT     Best call back number: [phone]    Chief complaint: LUMBAR     Type of visit: EPIDURAL LUMBAR BLOCK   Requested date: ASAP      If rescheduling, when is the original appointment: 05/02/2024     Additional notes:PATIENT IS SICK WITH A COUGH , WOULD LIKE TO RESCHEDULE - PLEASE CALL AFTER 3 PM TO RESCHEDULE

## 2025-08-18 ENCOUNTER — OFFICE VISIT (OUTPATIENT)
Dept: ORTHOPEDIC SURGERY | Facility: CLINIC | Age: 65
End: 2025-08-18
Payer: COMMERCIAL

## 2025-08-18 VITALS — WEIGHT: 197 LBS | OXYGEN SATURATION: 99 % | BODY MASS INDEX: 32.82 KG/M2 | HEIGHT: 65 IN | HEART RATE: 75 BPM

## 2025-08-18 DIAGNOSIS — M75.92 SUPRASPINATUS TENDINITIS, LEFT: ICD-10-CM

## 2025-08-18 DIAGNOSIS — M77.8 SUBSCAPULARIS TENDINITIS, LEFT: ICD-10-CM

## 2025-08-18 DIAGNOSIS — M25.512 CHRONIC LEFT SHOULDER PAIN: ICD-10-CM

## 2025-08-18 DIAGNOSIS — G89.29 CHRONIC LEFT SHOULDER PAIN: ICD-10-CM

## 2025-08-18 RX ADMIN — LIDOCAINE HYDROCHLORIDE 2 ML: 10 INJECTION, SOLUTION EPIDURAL; INFILTRATION; INTRACAUDAL; PERINEURAL at 13:23

## 2025-08-18 RX ADMIN — TRIAMCINOLONE ACETONIDE 80 MG: 40 INJECTION, SUSPENSION INTRA-ARTICULAR; INTRAMUSCULAR at 13:23

## 2025-08-19 ENCOUNTER — PATIENT ROUNDING (BHMG ONLY) (OUTPATIENT)
Dept: ORTHOPEDIC SURGERY | Facility: CLINIC | Age: 65
End: 2025-08-19
Payer: COMMERCIAL

## 2025-08-19 RX ORDER — TRIAMCINOLONE ACETONIDE 40 MG/ML
80 INJECTION, SUSPENSION INTRA-ARTICULAR; INTRAMUSCULAR
Status: COMPLETED | OUTPATIENT
Start: 2025-08-18 | End: 2025-08-18

## 2025-08-19 RX ORDER — LIDOCAINE HYDROCHLORIDE 10 MG/ML
2 INJECTION, SOLUTION EPIDURAL; INFILTRATION; INTRACAUDAL; PERINEURAL
Status: COMPLETED | OUTPATIENT
Start: 2025-08-18 | End: 2025-08-18

## 2025-08-20 ENCOUNTER — TELEPHONE (OUTPATIENT)
Age: 65
End: 2025-08-20
Payer: COMMERCIAL

## 2025-08-20 DIAGNOSIS — M25.512 CHRONIC LEFT SHOULDER PAIN: Primary | ICD-10-CM

## 2025-08-20 DIAGNOSIS — G89.29 CHRONIC LEFT SHOULDER PAIN: Primary | ICD-10-CM

## 2025-08-21 RX ORDER — TRAMADOL HYDROCHLORIDE 50 MG/1
50 TABLET ORAL EVERY 6 HOURS PRN
Qty: 28 TABLET | Refills: 0 | Status: SHIPPED | OUTPATIENT
Start: 2025-08-21

## 2025-08-26 ENCOUNTER — TELEPHONE (OUTPATIENT)
Dept: ORTHOPEDIC SURGERY | Facility: CLINIC | Age: 65
End: 2025-08-26
Payer: COMMERCIAL

## 2025-08-28 ENCOUNTER — TREATMENT (OUTPATIENT)
Dept: PHYSICAL THERAPY | Facility: CLINIC | Age: 65
End: 2025-08-28
Payer: COMMERCIAL

## 2025-08-28 DIAGNOSIS — M25.612 SHOULDER STIFFNESS, LEFT: ICD-10-CM

## 2025-08-28 DIAGNOSIS — M25.512 ACUTE PAIN OF LEFT SHOULDER: Primary | ICD-10-CM

## 2025-08-28 DIAGNOSIS — R29.898 WEAKNESS OF LEFT SHOULDER: ICD-10-CM

## 2025-08-28 PROCEDURE — 97161 PT EVAL LOW COMPLEX 20 MIN: CPT

## 2025-08-28 PROCEDURE — 97014 ELECTRIC STIMULATION THERAPY: CPT

## 2025-08-28 PROCEDURE — 97140 MANUAL THERAPY 1/> REGIONS: CPT

## 2025-08-28 PROCEDURE — 97110 THERAPEUTIC EXERCISES: CPT
